# Patient Record
(demographics unavailable — no encounter records)

---

## 2024-10-15 NOTE — DISCUSSION/SUMMARY
[FreeTextEntry1] : Constant symptoms of fatigue, tiredness, with habitual loud snoring, witnessed apnea episodes -suspect sleep apnea syndrome; consider sleep study.  Shortness of breath:  Significantly improved.  No CAD noted on CTA.  Continue follow up with pulm.    Dyslipidemia - Continue current meds.  LDL: 83.  Tolerating Crestor.  Overweight -weight regular exercise.  At present, there are no active cardiac conditions.  No recent unstable coronary syndromes, decompensated heart failure, severe valvular heart disease or significant dysrhythmias.  Baseline functional status is acceptable.  The clinical benefit of the proposed procedure outweighs the associated cardiovascular risk.  Risk not attenuated with further CV testing.  Prior testing as outlined above. Optimized from a cardiovascular perspective.  F/U 6 months.

## 2024-10-15 NOTE — ADDENDUM
[FreeTextEntry1] : Please note the patient was reviewed with the PA. I was physically present during the service of the patient I was directly involved in the management plan and recommendations of care provided to the patient.  I personally reviewed the history and physical exam and plan as documented by the PA above.  Randal Grijalva DO, FACC, [facility] Cardiologist

## 2024-10-15 NOTE — ADDENDUM
[FreeTextEntry1] : Please note the patient was reviewed with the PA. I was physically present during the service of the patient I was directly involved in the management plan and recommendations of care provided to the patient.  I personally reviewed the history and physical exam and plan as documented by the PA above.  Randal Grijalva DO, FACC, [facility] Cardiologist

## 2024-10-15 NOTE — HISTORY OF PRESENT ILLNESS
[FreeTextEntry1] : 65 show  female patient with history of pulm embolism, dyslipidemia, CAT scan showed coronary calcification as well as multiple nodules follow-up with the pulmonologist.   No prior CHF, MI.  Presents today to review the results of her coronary artery CTA.  States that her NAVA has improved significantly since she was last seen.  States that she has been able to go from 15 minutes of exercise in her pool to 30 before she closed it.  She is also able to walk into town with no exertional symptoms which she was not previously able to do.  There is no chest pain.  She makes note that she is going to be having eyelid surgery to fix ptosis with sight MD.  Date and surgeon TBD.   10/2/24:  Coronary artery CTA.  Normal coronary arteries.  No CAD noted.  Stable pulmonary nodules.  Stable adrenal gland and hepatic cysts.   8/20/2024 echocardiogram.  EF: 60 to 65%.  Mild LVH.  Normal diastolic function.  RV not well-visualized.  Trace AR MR and TR.  PASP: 14 mmHg.  No pericardial effusion. 8/21/2024.  Pharmacologic nuclear stress test.  No evidence of ischemia by EKG and normal myocardial perfusion.

## 2025-03-27 NOTE — DISCUSSION/SUMMARY
[FreeTextEntry1] : Constant symptoms of fatigue, tiredness, with habitual loud snoring, witnessed apnea episodes -suspect sleep apnea syndrome; consider sleep study.  Shortness of breath:  Resolved.  No CAD noted on CTA.  Continue follow up with pulm.    Dyslipidemia - Continue current meds.  LDL: 83.  Tolerating Crestor.  Overweight -weight regular exercise.  Pt is still cleared from a cardiac standpoint to undergo low risk procedure.  She has no documented CAD and has good exercise tolerance with no exertional symptoms.  At present, there are no active cardiac conditions.  No recent unstable coronary syndromes, decompensated heart failure, severe valvular heart disease or significant dysrhythmias.  Baseline functional status is acceptable.  The clinical benefit of the proposed procedure outweighs the associated cardiovascular risk.  Risk not attenuated with further CV testing.  Prior testing as outlined above. Optimized from a cardiovascular perspective.  F/U 1 year or as needed.

## 2025-03-27 NOTE — ADDENDUM
[FreeTextEntry1] :  I personally seen the patient and performed the evaluation and management services. I discussed the care with the ACP , reviewed the note, and agree with plan of care as outlined above, except where documented below, which is my personal assessment/plan.  Constant symptoms of fatigue, tiredness, with habitual loud snoring, witnessed apnea episodes -suspect sleep apnea syndrome; consider sleep study.  Shortness of breath:  Resolved.  No CAD noted on CTA.  Continue follow up with pulm.    Dyslipidemia - Continue current meds.  LDL: 83.  Tolerating Crestor.  Overweight -weight regular exercise.  Pt is still cleared from a cardiac standpoint to undergo low risk procedure.  She has no documented CAD and has good exercise tolerance with no exertional symptoms.  At present, there are no active cardiac conditions.  No recent unstable coronary syndromes, decompensated heart failure, severe valvular heart disease or significant dysrhythmias.  Baseline functional status is acceptable.  The clinical benefit of the proposed procedure outweighs the associated cardiovascular risk.  Risk not attenuated with further CV testing.  Prior testing as outlined above. Optimized from a cardiovascular perspective.

## 2025-03-27 NOTE — HISTORY OF PRESENT ILLNESS
[FreeTextEntry1] : 65 show  female patient with history of pulm embolism, dyslipidemia, CAT scan showed coronary calcification as well as multiple nodules follow-up with the pulmonologist.   No prior CHF, MI.  Presents today to review the results of her coronary artery CTA.  States that her NAVA has resolved since last being seen.  She is now walking 1 mile several days per week with no exertional symptoms.  There is no chest pain.  She makes note that she is going to be having eyelid surgery to fix ptosis with sight MD.  Date and surgeon ZINA.   BP on my evaluation was 120/70 mmHg after being allowed to sit quietly for several minutes.  10/2/24:  Coronary artery CTA.  Normal coronary arteries.  No CAD noted.  Stable pulmonary nodules.  Stable adrenal gland and hepatic cysts.   8/20/2024 echocardiogram.  EF: 60 to 65%.  Mild LVH.  Normal diastolic function.  RV not well-visualized.  Trace AR MR and TR.  PASP: 14 mmHg.  No pericardial effusion. 8/21/2024.  Pharmacologic nuclear stress test.  No evidence of ischemia by EKG and normal myocardial perfusion.

## 2025-03-27 NOTE — PHYSICAL EXAM
[Well Developed] : well developed [Well Nourished] : well nourished [No Acute Distress] : no acute distress [Normal Conjunctiva] : normal conjunctiva [Normal Venous Pressure] : normal venous pressure [No Carotid Bruit] : no carotid bruit [Normal S1, S2] : normal S1, S2 [No Murmur] : no murmur [No Rub] : no rub [No Gallop] : no gallop [Clear Lung Fields] : clear lung fields [Good Air Entry] : good air entry [No Respiratory Distress] : no respiratory distress  [Normal Bowel Sounds] : normal bowel sounds [Normal Gait] : normal gait [No Edema] : no edema [No Cyanosis] : no cyanosis [No Clubbing] : no clubbing [No Varicosities] : no varicosities [No Rash] : no rash [No Skin Lesions] : no skin lesions [Moves all extremities] : moves all extremities [Normal Speech] : normal speech [Alert and Oriented] : alert and oriented